# Patient Record
Sex: FEMALE | Race: WHITE | NOT HISPANIC OR LATINO
[De-identification: names, ages, dates, MRNs, and addresses within clinical notes are randomized per-mention and may not be internally consistent; named-entity substitution may affect disease eponyms.]

---

## 2022-03-29 PROBLEM — Z00.00 ENCOUNTER FOR PREVENTIVE HEALTH EXAMINATION: Status: ACTIVE | Noted: 2022-03-29

## 2022-03-31 ENCOUNTER — APPOINTMENT (OUTPATIENT)
Dept: OTOLARYNGOLOGY | Facility: CLINIC | Age: 52
End: 2022-03-31
Payer: COMMERCIAL

## 2022-03-31 VITALS
TEMPERATURE: 97.1 F | HEIGHT: 63 IN | SYSTOLIC BLOOD PRESSURE: 102 MMHG | HEART RATE: 71 BPM | BODY MASS INDEX: 27.46 KG/M2 | DIASTOLIC BLOOD PRESSURE: 66 MMHG | WEIGHT: 155 LBS

## 2022-03-31 DIAGNOSIS — R05.3 CHRONIC COUGH: ICD-10-CM

## 2022-03-31 DIAGNOSIS — Z78.9 OTHER SPECIFIED HEALTH STATUS: ICD-10-CM

## 2022-03-31 DIAGNOSIS — K21.9 GASTRO-ESOPHAGEAL REFLUX DISEASE W/OUT ESOPHAGITIS: ICD-10-CM

## 2022-03-31 PROCEDURE — 99204 OFFICE O/P NEW MOD 45 MIN: CPT | Mod: 25

## 2022-03-31 PROCEDURE — 31575 DIAGNOSTIC LARYNGOSCOPY: CPT

## 2022-03-31 NOTE — HISTORY OF PRESENT ILLNESS
[de-identified] : 52F here for initial evaluation.\par \par For at least the past 5 years or so, she has had frequent dry hacking cough and feeling of phlegm in her throat. She has noticed that her cough is exacerbated/caused by particular foods, including chocolate, spicy and caffeine and also is worsened when laying down flat at night. \par She has seen GI physician in past and EGD showed hiatal hernia. She has also seen ENT as well.\par She was told she probably has reflux and has been on a few courses of PPIs in the past though not consistently nor at high doses and is unsure if they helped.\par There is no allergies, asthma or h/o sinusitis.\par Of note, 2 years ago, she suffered episode of ischemic colitis which was temporary and improved on its own.\par \par ROS otherwise unremarkable.

## 2022-03-31 NOTE — ASSESSMENT
[FreeTextEntry1] : 52F here for initial evaluation. For at least the past 5 years or so, she has had frequent dry hacking cough and feeling of phlegm in her throat. These sx are daily. She has noticed that her cough is exacerbated/caused by particular foods, including chocolate, spicy and caffeine and also is worsened when laying down flat at night or after big meals. She has seen GI physician in past and EGD showed hiatal hernia. She has also seen ENT as well.\par She was told she probably has reflux and has been on a few courses of PPIs in the past though not consistently nor at high doses and is unsure if they helped. There is no allergies, asthma or h/o sinusitis. Complete and comprehensive head and neck exam, including fiberoptic laryngoscopy, is unremarkable.\par Given history, I agree that she likely primarily has laryngopharyngeal reflux and is not medically optimized. To that end, will start BID PPI and H2B at night with strict diet/lifestyle changes for 6 weeks and f/u w pt thereafter. Should there be improvement will continue treatment and slowly taper. 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 This was all discussed at length and all questions answered.

## 2022-03-31 NOTE — CONSULT LETTER
[Dear  ___] : Dear  [unfilled], [Courtesy Letter:] : I had the pleasure of seeing your patient, [unfilled], in my office today. [Consult Closing:] : Thank you very much for allowing me to participate in the care of this patient.  If you have any questions, please do not hesitate to contact me. [Sincerely,] : Sincerely, [FreeTextEntry3] : Bakari Mcnair MD\par Department of Otolaryngology - Head and Neck Surgery\par [facility]

## 2022-03-31 NOTE — PROCEDURE
[FreeTextEntry3] : Fiberoptic Laryngoscopy:\par upper airway widely patent\par no masses/lesions\par TVF symmetric and mobile

## 2022-03-31 NOTE — PHYSICAL EXAM
[de-identified] : soft, flat, no masses/lesions [Midline] : trachea located in midline position [Laryngoscopy Performed] : laryngoscopy was performed, see procedure section for findings [Normal] : no rashes

## 2022-08-31 RX ORDER — FAMOTIDINE 40 MG/1
40 TABLET, FILM COATED ORAL
Qty: 90 | Refills: 1 | Status: ACTIVE | COMMUNITY
Start: 2022-03-31 | End: 1900-01-01

## 2023-03-17 RX ORDER — AMITRIPTYLINE HYDROCHLORIDE 10 MG/1
10 TABLET, FILM COATED ORAL
Qty: 30 | Refills: 1 | Status: ACTIVE | COMMUNITY
Start: 2022-05-04 | End: 1900-01-01

## 2023-06-15 RX ORDER — PANTOPRAZOLE 40 MG/1
40 TABLET, DELAYED RELEASE ORAL TWICE DAILY
Qty: 120 | Refills: 0 | Status: ACTIVE | COMMUNITY
Start: 2022-03-31 | End: 1900-01-01